# Patient Record
Sex: FEMALE | Race: WHITE | NOT HISPANIC OR LATINO | Employment: UNEMPLOYED | URBAN - METROPOLITAN AREA
[De-identification: names, ages, dates, MRNs, and addresses within clinical notes are randomized per-mention and may not be internally consistent; named-entity substitution may affect disease eponyms.]

---

## 2023-04-15 LAB
CLUE CELLS: ABNORMAL
NUGENT SCORE: 2
YEAST: PRESENT

## 2023-04-17 LAB — GROUP B STREP SCREEN: NORMAL

## 2024-02-01 ENCOUNTER — TELEPHONE (OUTPATIENT)
Dept: PRIMARY CARE | Facility: CLINIC | Age: 24
End: 2024-02-01
Payer: MEDICAID

## 2024-02-01 NOTE — TELEPHONE ENCOUNTER
Patient wanted to give her 2/7 appointment to grandmother Claudia Kay and wanted to make sure that was allowed. Claudia would be unable to make 2/1 appointment. Please advise.

## 2024-02-05 ENCOUNTER — TELEMEDICINE (OUTPATIENT)
Dept: OBSTETRICS AND GYNECOLOGY | Facility: CLINIC | Age: 24
End: 2024-02-05
Payer: MEDICAID

## 2024-02-05 VITALS — WEIGHT: 240 LBS | HEIGHT: 66 IN | BODY MASS INDEX: 38.57 KG/M2

## 2024-02-05 DIAGNOSIS — Z34.81 MULTIGRAVIDA IN FIRST TRIMESTER (HHS-HCC): Primary | ICD-10-CM

## 2024-02-05 DIAGNOSIS — O21.9 NAUSEA AND VOMITING IN PREGNANCY PRIOR TO 22 WEEKS GESTATION (HHS-HCC): ICD-10-CM

## 2024-02-05 PROCEDURE — 1036F TOBACCO NON-USER: CPT | Performed by: NURSE PRACTITIONER

## 2024-02-05 PROCEDURE — 99213 OFFICE O/P EST LOW 20 MIN: CPT | Performed by: NURSE PRACTITIONER

## 2024-02-05 RX ORDER — PYRIDOXINE HCL (VITAMIN B6) 25 MG
25 TABLET ORAL EVERY 8 HOURS
Qty: 90 TABLET | Refills: 1 | Status: SHIPPED | OUTPATIENT
Start: 2024-02-05 | End: 2024-05-05

## 2024-02-05 NOTE — PROGRESS NOTES
"Pt wants  with this pregnancy & wants a tubal. Pt also requesting PNV to be called into her pharmacy. C/o mild nausea.    Patient presents for a telehealth visit with cc of missed LMP.   Patient's last menstrual period was 2023.   Ht 1.676 m (5' 6\")   Wt 109 kg (240 lb)   LMP 2023   BMI 38.74 kg/m²    Estimated GA (based on LMP): 6w 5d  Estimated MONTY (based on LMP): 2024  She completed a home pregnancy test that was +.   Since + UPT she denies vaginal bleeding or c/o unusual pain.  Unplanned pregnancy. Coping well. FOB: Lauro    Latex allergy? Yes   Blood transfusion acceptable? Yes    28 day cycle? Yes  BC at conception No  hCG+ at home (date)2024    She is seeking to establish with  [facility].   Currently she is on PNV medications.   Current Outpatient Medications on File Prior to Visit   Medication Sig Dispense Refill    PRENATAL 2-IRON-FOLIC ACID-OM3 ORAL Take 1 tablet by mouth once daily.       No current facility-administered medications on file prior to visit.      She   Denies smoking,   Denies drugs,   Denies alcohol consumption.     OB History    Para Term  AB Living   2 1 1     1   SAB IAB Ectopic Multiple Live Births           1      # Outcome Date GA Lbr Harley/2nd Weight Sex Delivery Anes PTL Lv   2 Current            1 Term 23 38w0d  3.515 kg M CS-LTranv Spinal N MADYSON      Complications: Elevated BP without diagnosis of hypertension     History reviewed. No pertinent past medical history.   Past Surgical History:   Procedure Laterality Date     SECTION, LOW TRANSVERSE  2023    OTHER SURGICAL HISTORY  2022    Appendectomy    OTHER SURGICAL HISTORY  2022    Cholecystectomy    OTHER SURGICAL HISTORY  2022    [place] tooth extraction       Family History   Problem Relation Name Age of Onset    Other (hysterectomy) Mother      Ovarian cancer Maternal Grandmother      Other (hysterectomy) Maternal Grandmother      " Stroke Paternal Grandfather      Diabetes Paternal Grandfather      Heart attack Cousin          Family Hx:  No family hx concerns of thrombophilias.   Denies family hx of births with complications, denies chromosomal or structural abnormalities in family member's births.       She denies complications during the course of her previous pregnancies.      Personal medical and surgical history reviewed:  She denies personal hx of STIs including: HIV, RPR, HEP B, HEP C, and HERPES   No personal hx of DM or HTN.   No personal hx of GYN surgeries.       Previous genetic testing:   CF/ SMA/ Thalassemias    ROS:  + nausea  +breast tenderness  +missed LMP        Plan:      Education about care [place]: Delivery planned for Shakira, Nutrition in weight gain counseling, Toxoplasmosis precautions, Environmental/Work Hazzards, Risk factors , and Maintaining midwifery care   ASA criteria   Based on intake patient meets criteria to maintain midwifery care.   Discussed dating ultrasound, pt accepts will schedule accordingly with RTO in to follow for NEW OB initial exam and OBV.  Discussed unisom and b6 for N&V pt to call if this is not assisting to discuss further medication management.   Discussed mychart access and calling for emergencies if needed.     Pregnancy hx:  gHTN at term   -->ASA at 12-16 wga  N&V  -->unisom and b6  Previous c/s   --> would like repeat c/s and tubal/BS     Sissy Baeza, APRN-CNM, APRN-CNP

## 2024-02-07 ENCOUNTER — APPOINTMENT (OUTPATIENT)
Dept: PRIMARY CARE | Facility: CLINIC | Age: 24
End: 2024-02-07
Payer: MEDICAID

## 2024-02-13 ENCOUNTER — TELEPHONE (OUTPATIENT)
Dept: OBSTETRICS AND GYNECOLOGY | Facility: CLINIC | Age: 24
End: 2024-02-13
Payer: MEDICAID

## 2024-02-13 NOTE — TELEPHONE ENCOUNTER
Patient is requesting a rx for metronidasole. She needed it with her last pregnancy around this time. Pharmacy is up to date. Thank you

## 2024-02-15 ENCOUNTER — HOSPITAL ENCOUNTER (OUTPATIENT)
Dept: RADIOLOGY | Facility: CLINIC | Age: 24
Discharge: HOME | End: 2024-02-15
Payer: MEDICAID

## 2024-02-15 DIAGNOSIS — O26.841 SIZE OF FETUS INCONSISTENT WITH DATES IN FIRST TRIMESTER (HHS-HCC): ICD-10-CM

## 2024-02-15 DIAGNOSIS — Z34.81 MULTIGRAVIDA IN FIRST TRIMESTER (HHS-HCC): ICD-10-CM

## 2024-02-15 PROCEDURE — 76817 TRANSVAGINAL US OBSTETRIC: CPT

## 2024-02-15 PROCEDURE — 76817 TRANSVAGINAL US OBSTETRIC: CPT | Performed by: OBSTETRICS & GYNECOLOGY

## 2024-02-15 PROCEDURE — 76801 OB US < 14 WKS SINGLE FETUS: CPT | Performed by: OBSTETRICS & GYNECOLOGY

## 2024-02-15 PROCEDURE — 76801 OB US < 14 WKS SINGLE FETUS: CPT

## 2024-02-27 ENCOUNTER — INITIAL PRENATAL (OUTPATIENT)
Dept: OBSTETRICS AND GYNECOLOGY | Facility: CLINIC | Age: 24
End: 2024-02-27
Payer: MEDICAID

## 2024-02-27 ENCOUNTER — HOSPITAL ENCOUNTER (OUTPATIENT)
Dept: RADIOLOGY | Facility: HOSPITAL | Age: 24
Discharge: HOME | End: 2024-02-27
Payer: MEDICAID

## 2024-02-27 VITALS — BODY MASS INDEX: 42.55 KG/M2 | DIASTOLIC BLOOD PRESSURE: 72 MMHG | WEIGHT: 263.6 LBS | SYSTOLIC BLOOD PRESSURE: 110 MMHG

## 2024-02-27 DIAGNOSIS — O34.219 PREVIOUS CESAREAN SECTION COMPLICATING PREGNANCY (HHS-HCC): ICD-10-CM

## 2024-02-27 DIAGNOSIS — R35.0 URINARY FREQUENCY: ICD-10-CM

## 2024-02-27 DIAGNOSIS — Z20.2 EXPOSURE TO STD: ICD-10-CM

## 2024-02-27 DIAGNOSIS — Z3A.08 8 WEEKS GESTATION OF PREGNANCY (HHS-HCC): ICD-10-CM

## 2024-02-27 DIAGNOSIS — O20.0 THREATENED ABORTION, ANTEPARTUM CONDITION OR COMPLICATION (HHS-HCC): ICD-10-CM

## 2024-02-27 DIAGNOSIS — Z34.81 MULTIGRAVIDA IN FIRST TRIMESTER (HHS-HCC): Primary | ICD-10-CM

## 2024-02-27 DIAGNOSIS — Z12.4 CERVICAL CANCER SCREENING: ICD-10-CM

## 2024-02-27 PROCEDURE — 99213 OFFICE O/P EST LOW 20 MIN: CPT | Performed by: NURSE PRACTITIONER

## 2024-02-27 PROCEDURE — 87800 DETECT AGNT MULT DNA DIREC: CPT

## 2024-02-27 PROCEDURE — 76801 OB US < 14 WKS SINGLE FETUS: CPT

## 2024-02-27 PROCEDURE — 88175 CYTOPATH C/V AUTO FLUID REDO: CPT

## 2024-02-27 PROCEDURE — 87086 URINE CULTURE/COLONY COUNT: CPT

## 2024-02-27 PROCEDURE — 87186 SC STD MICRODIL/AGAR DIL: CPT

## 2024-02-27 PROCEDURE — 87661 TRICHOMONAS VAGINALIS AMPLIF: CPT

## 2024-02-27 PROCEDURE — H1000 PRENATAL CARE ATRISK ASSESSM: HCPCS | Performed by: NURSE PRACTITIONER

## 2024-02-27 RX ORDER — NITROFURANTOIN 25; 75 MG/1; MG/1
100 CAPSULE ORAL 2 TIMES DAILY
Qty: 14 CAPSULE | Refills: 0 | Status: SHIPPED | OUTPATIENT
Start: 2024-02-27 | End: 2024-03-05

## 2024-02-27 NOTE — PROGRESS NOTES
C/o urinary urgency.    Subjective   Patient ID [id number]   ez Kay is a 23 y.o.  at Unknown with a working estimated date of delivery of Not found. who presents for an initial prenatal visit. This pregnancy is planned. Overall in pregnancy feeling nauseate. Feeling frequency with urination and started AZO of the kidney.     Spotting last week that started- only with wiping  S<D off with regular menses.       Dating on ultrasound, MONTY confirmed and changed based on sono  Her pregnancy is complicated by:  Hx of LTCS- desires repeat LTCS    OB History    Para Term  AB Living   2 1 1     1   SAB IAB Ectopic Multiple Live Births           1      # Outcome Date GA Lbr Harley/2nd Weight Sex Delivery Anes PTL Lv   2 Current            1 Term 23 38w0d  3.515 kg M CS-LTranv Spinal N MADYSON      Complications: Elevated BP without diagnosis of hypertension     Genetic Screening       Positive    None             Negative    Genetic Screening/Teratology Counseling- Includes patient, baby's father, or anyone in either family with:        Patient's age 35 years or older as of estimated date of delivery   No        Thalassemia (Italian, Greek, Mediterranean, or  background): MCV less than 80   No        Neural tube defect (Meningomyelocele, Spina bifida, or Anencephaly)   No        Congenital heart defect   No        Down syndrome   No        Rey-Sachs (Ashkenazi Christian, Cajun, Divehi Venezuelan)   No        Canavan disease (Ashkenazi Christian)   No        Familial dysautonomia (Ashkenazi Christian)   No        Sickle cell disease or trait ()   No        Hemophilia or other blood disorders   No        Muscular dystrophy   No        Cystic fibrosis   No        Meggan's chorea   No        Intellectual disability and/or autism   No        Other inherited genetic or chromosomal disorder   No        Maternal metabolic disorder (eg. Type 1 diabetes, PKU)   No        Patient or baby's father had child  with birth defects not listed above   No        Recurrent pregnancy loss, or a stillbirth   No        Medications (including supplements, vitamins, herbs, or OTC drugs)/illicit/recreational drugs/alcohol since last menstrual period   No                            Objective   Physical Exam  Weight: 120 kg (263 lb 9.6 oz)  Expected Total Weight Gain: Could not be calculated   Pregravid BMI: Could not be calculated  BP: 110/72          OBGyn Exam  See Initial OBV intake   Prenatal Labs      Assessment/Plan   Diagnoses and all orders for this visit:  Multigravida in first trimester  -     Urine culture  Cervical cancer screening  -     THINPREP PAP  Exposure to STD  Urinary frequency  -     nitrofurantoin, macrocrystal-monohydrate, (Macrobid) 100 mg capsule; Take 1 capsule (100 mg) by mouth 2 times a day for 7 days.  -     Urine culture  8 weeks gestation of pregnancy  -     US pelvis; Future  Previous  section complicating pregnancy  Threatened , antepartum condition or complication  -     US pelvis; Future      Immunizations: declines flu shot  Prenatal Labs ordered  Daily prenatal vitamins prescribed  First trimester screening and second trimester screening discussed. Patient decided to cfDNA  Follow up in 3 weeks for return OB visit.    Spotting since last ultrasound and cramping  Repeat ultrasound today for viability    Sissy Baeza, APRN-CNM, APRN-CNP

## 2024-02-29 ENCOUNTER — TELEPHONE (OUTPATIENT)
Dept: OBSTETRICS AND GYNECOLOGY | Facility: CLINIC | Age: 24
End: 2024-02-29
Payer: MEDICAID

## 2024-02-29 LAB
C TRACH RRNA SPEC QL NAA+PROBE: NEGATIVE
N GONORRHOEA DNA SPEC QL PROBE+SIG AMP: NEGATIVE
T VAGINALIS RRNA SPEC QL NAA+PROBE: NEGATIVE

## 2024-03-01 LAB — BACTERIA UR CULT: ABNORMAL

## 2024-03-01 NOTE — TELEPHONE ENCOUNTER
Yes, if she would prefer to go the surgical route please set her up with a physician for a surgical consult and they can help her. I am happy to see her for any other reason if she would like support, etc. Thanks

## 2024-03-01 NOTE — RESULT ENCOUNTER NOTE
Hi,   I don't see a C&S, just the cultures. Can you see if they are doing a sensitivity.   Awa was sent macrobid in already for tx.   Michelle

## 2024-03-04 ENCOUNTER — ANESTHESIA EVENT (OUTPATIENT)
Dept: OPERATING ROOM | Facility: HOSPITAL | Age: 24
End: 2024-03-04

## 2024-03-04 ENCOUNTER — DOCUMENTATION (OUTPATIENT)
Dept: OBSTETRICS AND GYNECOLOGY | Facility: CLINIC | Age: 24
End: 2024-03-04

## 2024-03-04 ENCOUNTER — OFFICE VISIT (OUTPATIENT)
Dept: OBSTETRICS AND GYNECOLOGY | Facility: CLINIC | Age: 24
End: 2024-03-04
Payer: MEDICAID

## 2024-03-04 ENCOUNTER — PREP FOR PROCEDURE (OUTPATIENT)
Dept: OBSTETRICS AND GYNECOLOGY | Facility: CLINIC | Age: 24
End: 2024-03-04

## 2024-03-04 ENCOUNTER — HOSPITAL ENCOUNTER (OUTPATIENT)
Facility: HOSPITAL | Age: 24
Setting detail: OUTPATIENT SURGERY
End: 2024-03-04
Attending: OBSTETRICS & GYNECOLOGY | Admitting: OBSTETRICS & GYNECOLOGY
Payer: MEDICAID

## 2024-03-04 VITALS — WEIGHT: 258 LBS | DIASTOLIC BLOOD PRESSURE: 62 MMHG | SYSTOLIC BLOOD PRESSURE: 102 MMHG | BODY MASS INDEX: 41.64 KG/M2

## 2024-03-04 DIAGNOSIS — O02.1 MISSED ABORTION (HHS-HCC): Primary | ICD-10-CM

## 2024-03-04 PROCEDURE — 99214 OFFICE O/P EST MOD 30 MIN: CPT | Performed by: OBSTETRICS & GYNECOLOGY

## 2024-03-04 PROCEDURE — 1036F TOBACCO NON-USER: CPT | Performed by: OBSTETRICS & GYNECOLOGY

## 2024-03-04 RX ORDER — GABAPENTIN 600 MG/1
600 TABLET ORAL ONCE
Status: CANCELLED | OUTPATIENT
Start: 2024-03-04 | End: 2024-03-04

## 2024-03-04 RX ORDER — MORPHINE SULFATE 4 MG/ML
4 INJECTION INTRAVENOUS EVERY 5 MIN PRN
Status: CANCELLED | OUTPATIENT
Start: 2024-03-04

## 2024-03-04 RX ORDER — ONDANSETRON HYDROCHLORIDE 2 MG/ML
4 INJECTION, SOLUTION INTRAVENOUS ONCE AS NEEDED
Status: CANCELLED | OUTPATIENT
Start: 2024-03-04

## 2024-03-04 RX ORDER — ONDANSETRON HYDROCHLORIDE 2 MG/ML
4 INJECTION, SOLUTION INTRAVENOUS ONCE
Status: CANCELLED | OUTPATIENT
Start: 2024-03-04 | End: 2024-03-04

## 2024-03-04 RX ORDER — ALBUTEROL SULFATE 0.83 MG/ML
2.5 SOLUTION RESPIRATORY (INHALATION) ONCE
Status: CANCELLED | OUTPATIENT
Start: 2024-03-05

## 2024-03-04 RX ORDER — METOCLOPRAMIDE HYDROCHLORIDE 5 MG/ML
10 INJECTION INTRAMUSCULAR; INTRAVENOUS ONCE
Status: CANCELLED | OUTPATIENT
Start: 2024-03-04 | End: 2024-03-04

## 2024-03-04 RX ORDER — FAMOTIDINE 10 MG/ML
20 INJECTION INTRAVENOUS ONCE
Status: CANCELLED | OUTPATIENT
Start: 2024-03-04 | End: 2024-03-04

## 2024-03-04 RX ORDER — SODIUM CHLORIDE 9 MG/ML
50 INJECTION, SOLUTION INTRAVENOUS CONTINUOUS
Status: CANCELLED | OUTPATIENT
Start: 2024-03-05

## 2024-03-04 RX ORDER — SODIUM CITRATE AND CITRIC ACID MONOHYDRATE 334; 500 MG/5ML; MG/5ML
30 SOLUTION ORAL ONCE
Status: CANCELLED | OUTPATIENT
Start: 2024-03-04 | End: 2024-03-04

## 2024-03-04 RX ORDER — MORPHINE SULFATE 2 MG/ML
2 INJECTION, SOLUTION INTRAMUSCULAR; INTRAVENOUS EVERY 5 MIN PRN
Status: CANCELLED | OUTPATIENT
Start: 2024-03-04

## 2024-03-04 NOTE — ASSESSMENT & PLAN NOTE
-- MISSED AB: Ultrasound 2024 with gestational sac and CRL at 7.1 weeks with no cardiac activity.  Blood type O+, patient with mild cramping and spotting.  Discussed with patient missed AB and natural course.  Discussed options of observation, medical termination, or surgical termination.  Patient desires to proceed with a D&C.  Discussed risk and benefits and consent signed.  --Patient is P1 with     PLAN:  D&C scheduled

## 2024-03-04 NOTE — PROGRESS NOTES
Subjective   Patient ID: Awa Kay is a 23 y.o. female who presents for Follow-up (Possible Missed AB, started spotting Friday now it is turning to redish pink.).  HPI  23-year-old with a missed AB diagnosed on ultrasound.  Patient with slight cramping and spotting.        Objective   Physical Exam  Constitutional:       Appearance: Normal appearance. She is well-developed.   Cardiovascular:      Rate and Rhythm: Normal rate and regular rhythm.   Pulmonary:      Effort: Pulmonary effort is normal.      Breath sounds: Normal breath sounds.   Abdominal:      Palpations: Abdomen is soft. There is no mass.      Tenderness: There is no abdominal tenderness.   Neurological:      Mental Status: She is alert.   Psychiatric:         Mood and Affect: Mood normal.         Behavior: Behavior normal.         Assessment/Plan   Problem List Items Addressed This Visit             ICD-10-CM    Missed  - Primary O02.1     -- MISSED AB: Ultrasound 2024 with gestational sac and CRL at 7.1 weeks with no cardiac activity.  Blood type O+, patient with mild cramping and spotting.  Discussed with patient missed AB and natural course.  Discussed options of observation, medical termination, or surgical termination.  Patient desires to proceed with a D&C.  Discussed risk and benefits and consent signed.  --Patient is P1 with     PLAN:  D&C scheduled

## 2024-03-05 ENCOUNTER — APPOINTMENT (OUTPATIENT)
Dept: OBSTETRICS AND GYNECOLOGY | Facility: CLINIC | Age: 24
End: 2024-03-05
Payer: MEDICAID

## 2024-03-05 ENCOUNTER — ANESTHESIA (OUTPATIENT)
Dept: OPERATING ROOM | Facility: HOSPITAL | Age: 24
End: 2024-03-05

## 2024-03-05 NOTE — PROGRESS NOTES
Patient called in with heavier vaginal bleeding. She passed tissue and it is now slowing down. She had a D&C planned for tomorrow. Patient is going to call in and cancel surgery. We discussed bleeding and infection precautions in detail.

## 2024-03-07 ENCOUNTER — TELEPHONE (OUTPATIENT)
Dept: OBSTETRICS AND GYNECOLOGY | Facility: CLINIC | Age: 24
End: 2024-03-07
Payer: MEDICAID

## 2024-03-07 DIAGNOSIS — O02.1 MISSED ABORTION (HHS-HCC): ICD-10-CM

## 2024-03-07 NOTE — TELEPHONE ENCOUNTER
----- Message from Lynsey Joshua RN sent at 3/7/2024 11:15 AM EST -----  Dr Singh saw pt for missed AB Monday with plan for D&C Tuesday, pt called that night and was bleeding heavier and passing clots/tissue, she spoke with Dr Potts and D&C was cancelled. Pt called today to report bleeding has slowed down- she is now just having a bit of spotting and no longer cramping so she thinks miscarriage is complete and was told to call when this occurred so we can confirm all POC are passed. Notified Dr Singh to see if he wants to order ultrasound or hcgs, awaiting response

## 2024-03-08 ENCOUNTER — TELEPHONE (OUTPATIENT)
Dept: OBSTETRICS AND GYNECOLOGY | Facility: CLINIC | Age: 24
End: 2024-03-08
Payer: MEDICAID

## 2024-03-12 LAB
CYTOLOGY CMNT CVX/VAG CYTO-IMP: NORMAL
LAB AP HPV GENOTYPE QUESTION: YES
LAB AP HPV HR: NORMAL
LAB AP PAP ADDITIONAL TESTS: NORMAL
LABORATORY COMMENT REPORT: NORMAL
PATH REPORT.TOTAL CANCER: NORMAL

## 2024-04-01 ENCOUNTER — APPOINTMENT (OUTPATIENT)
Dept: RADIOLOGY | Facility: CLINIC | Age: 24
End: 2024-04-01
Payer: MEDICAID

## 2024-04-09 ENCOUNTER — TELEPHONE (OUTPATIENT)
Dept: OBSTETRICS AND GYNECOLOGY | Facility: CLINIC | Age: 24
End: 2024-04-09
Payer: MEDICAID

## 2024-06-18 ENCOUNTER — TELEPHONE (OUTPATIENT)
Dept: OBSTETRICS AND GYNECOLOGY | Facility: CLINIC | Age: 24
End: 2024-06-18
Payer: MEDICAID

## 2024-06-27 ENCOUNTER — APPOINTMENT (OUTPATIENT)
Dept: OBSTETRICS AND GYNECOLOGY | Facility: CLINIC | Age: 24
End: 2024-06-27
Payer: MEDICAID

## 2025-12-02 ENCOUNTER — APPOINTMENT (OUTPATIENT)
Dept: PRIMARY CARE | Facility: CLINIC | Age: 25
End: 2025-12-02
Payer: MEDICAID